# Patient Record
Sex: FEMALE | Race: WHITE | NOT HISPANIC OR LATINO | Employment: FULL TIME | URBAN - METROPOLITAN AREA
[De-identification: names, ages, dates, MRNs, and addresses within clinical notes are randomized per-mention and may not be internally consistent; named-entity substitution may affect disease eponyms.]

---
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
## 2022-01-13 ENCOUNTER — VBI (OUTPATIENT)
Dept: ADMINISTRATIVE | Facility: OTHER | Age: 22
End: 2022-01-13

## 2022-01-13 NOTE — TELEPHONE ENCOUNTER
Upon review of the In Basket request we were able to locate, review, and update the patient chart as requested for Chlamydia  Any additional questions or concerns should be emailed to the Practice Liaisons via [email or web address] email, please do not reply via In Basket      Thank you  Trinity Sanches

## 2022-06-02 ENCOUNTER — ANNUAL EXAM (OUTPATIENT)
Dept: OBGYN CLINIC | Facility: CLINIC | Age: 22
End: 2022-06-02
Payer: COMMERCIAL

## 2022-06-02 VITALS
SYSTOLIC BLOOD PRESSURE: 110 MMHG | WEIGHT: 197 LBS | DIASTOLIC BLOOD PRESSURE: 70 MMHG | HEIGHT: 62 IN | BODY MASS INDEX: 36.25 KG/M2

## 2022-06-02 DIAGNOSIS — Z01.419 ENCOUNTER FOR GYNECOLOGICAL EXAMINATION WITHOUT ABNORMAL FINDING: Primary | ICD-10-CM

## 2022-06-02 DIAGNOSIS — Z11.3 SCREEN FOR STD (SEXUALLY TRANSMITTED DISEASE): ICD-10-CM

## 2022-06-02 DIAGNOSIS — Z30.011 ENCOUNTER FOR PRESCRIPTION OF ORAL CONTRACEPTIVES: ICD-10-CM

## 2022-06-02 PROCEDURE — S0612 ANNUAL GYNECOLOGICAL EXAMINA: HCPCS | Performed by: NURSE PRACTITIONER

## 2022-06-02 RX ORDER — FLUVOXAMINE MALEATE 25 MG
TABLET ORAL
COMMUNITY
Start: 2022-04-27

## 2022-06-02 RX ORDER — NORETHINDRONE ACETATE AND ETHINYL ESTRADIOL 1MG-20(21)
1 KIT ORAL DAILY
Qty: 84 TABLET | Refills: 3 | Status: SHIPPED | OUTPATIENT
Start: 2022-06-02

## 2022-06-02 RX ORDER — FLUVOXAMINE MALEATE 100 MG
100 TABLET ORAL DAILY
COMMUNITY
Start: 2022-04-27

## 2022-06-02 NOTE — PATIENT INSTRUCTIONS
Pap smear to start at age 24 as per ASCCP guidelines  GC/CT cultures annually once sexually active, always condom use when sexually active, birth control as directed  Use seat belt in every car ride, avoid smoking and alcohol use, exercise most days of the week, obtain appropriate nutrition and hydration, follow up with PCP for appropriate vaccine schedule  Age 24-51 calcium 1000mg daily intake  Vit D daily recommended  Monthly breast self exam to start at age 25

## 2022-06-02 NOTE — PROGRESS NOTES
Assessment/Plan:  Pap smear to start at age 24 as per ASCCP guidelines  GC/CT cultures annually once sexually active, always condom use when sexually active, birth control as directed  Use seat belt in every car ride, avoid smoking and alcohol use, exercise most days of the week, obtain appropriate nutrition and hydration, follow up with PCP for appropriate vaccine schedule  Age 24-51 calcium 1000mg daily intake  Vit D daily recommended  Monthly breast self exam to start at age 25  Diagnoses and all orders for this visit:    Encounter for gynecological examination without abnormal finding  -     Chlamydia/GC amplified DNA by PCR    Encounter for prescription of oral contraceptives  -     norethindrone-ethinyl estradiol (Loestrin Fe 1/20) 1-20 MG-MCG per tablet; Take 1 tablet by mouth daily May take continuous active pills skipping placebos    Screen for STD (sexually transmitted disease)  -     Chlamydia/GC amplified DNA by PCR    Other orders  -     fluvoxaMINE (LUVOX) 100 mg tablet; Take 100 mg by mouth daily  -     fluvoxaMINE (LUVOX) 25 MG tablet; TAKE 1 TABLET ORALLY DAILY IN THE EVENING          Subjective:      Patient ID: Lane Evans is a 25 y o  female  Here for annual gyn ON OCP Has been off 2 mos as delayed getting here  Has gotten 2 periods and feels better getting a period Prev was on continuous OCP s sexually active so would like to get a period  Denies abdominal or pelvic pain  Bowel and bladder are normal No unusual discharge       The following portions of the patient's history were reviewed and updated as appropriate: allergies, current medications, past family history, past medical history, past social history, past surgical history and problem list     Review of Systems   Constitutional: Negative for fatigue and unexpected weight change  Gastrointestinal: Negative for abdominal distention, abdominal pain, constipation and diarrhea     Genitourinary: Negative for difficulty urinating, dyspareunia, dysuria, frequency, genital sores, menstrual problem, pelvic pain, urgency, vaginal bleeding, vaginal discharge and vaginal pain  Neurological: Negative for headaches  Hematological: Negative for adenopathy  Psychiatric/Behavioral: Negative  Negative for dysphoric mood  The patient is not nervous/anxious  Objective:      /70 (BP Location: Left arm, Patient Position: Sitting, Cuff Size: Standard)   Ht 5' 2" (1 575 m)   Wt 89 4 kg (197 lb)   LMP 05/10/2022   BMI 36 03 kg/m²          Physical Exam  Vitals and nursing note reviewed  Constitutional:       General: She is not in acute distress  Appearance: Normal appearance  HENT:      Head: Normocephalic and atraumatic  Pulmonary:      Effort: Pulmonary effort is normal    Chest:   Breasts: Breasts are symmetrical       Right: Normal  No mass, nipple discharge, skin change, tenderness or axillary adenopathy  Left: Normal  No mass, nipple discharge, skin change, tenderness or axillary adenopathy  Abdominal:      General: There is no distension  Palpations: Abdomen is soft  Tenderness: There is no abdominal tenderness  There is no guarding or rebound  Genitourinary:     General: Normal vulva  Exam position: Lithotomy position  Labia:         Right: No rash, tenderness, lesion or injury  Left: No rash, tenderness, lesion or injury  Urethra: No prolapse, urethral pain, urethral swelling or urethral lesion  Vagina: Normal  No erythema or lesions  Cervix: No cervical motion tenderness, discharge, lesion or cervical bleeding  Uterus: Normal        Adnexa: Right adnexa normal and left adnexa normal         Right: No mass or tenderness  Left: No mass or tenderness  Rectum: No mass or external hemorrhoid  Comments: G/C from cervix  Musculoskeletal:         General: Normal range of motion     Lymphadenopathy:      Upper Body:      Right upper body: No axillary adenopathy  Left upper body: No axillary adenopathy  Lower Body: No right inguinal adenopathy  No left inguinal adenopathy  Skin:     General: Skin is warm and dry  Neurological:      Mental Status: She is alert and oriented to person, place, and time  Psychiatric:         Mood and Affect: Mood normal          Behavior: Behavior normal          Thought Content:  Thought content normal          Judgment: Judgment normal

## 2022-06-03 LAB
C TRACH RRNA SPEC QL NAA+PROBE: NEGATIVE
N GONORRHOEA RRNA SPEC QL NAA+PROBE: NEGATIVE

## 2023-05-16 DIAGNOSIS — Z30.011 ENCOUNTER FOR PRESCRIPTION OF ORAL CONTRACEPTIVES: ICD-10-CM

## 2023-05-16 RX ORDER — NORETHINDRONE ACETATE AND ETHINYL ESTRADIOL AND FERROUS FUMARATE 1MG-20(21)
KIT ORAL
Qty: 84 TABLET | Refills: 0 | Status: SHIPPED | OUTPATIENT
Start: 2023-05-16

## 2023-06-27 ENCOUNTER — PATIENT MESSAGE (OUTPATIENT)
Dept: OBGYN CLINIC | Facility: CLINIC | Age: 23
End: 2023-06-27

## 2024-01-30 ENCOUNTER — ANNUAL EXAM (OUTPATIENT)
Dept: OBGYN CLINIC | Facility: CLINIC | Age: 24
End: 2024-01-30
Payer: COMMERCIAL

## 2024-01-30 VITALS
BODY MASS INDEX: 36.54 KG/M2 | WEIGHT: 206.2 LBS | SYSTOLIC BLOOD PRESSURE: 118 MMHG | HEIGHT: 63 IN | DIASTOLIC BLOOD PRESSURE: 72 MMHG

## 2024-01-30 DIAGNOSIS — Z11.3 SCREEN FOR STD (SEXUALLY TRANSMITTED DISEASE): ICD-10-CM

## 2024-01-30 DIAGNOSIS — B96.89 BV (BACTERIAL VAGINOSIS): ICD-10-CM

## 2024-01-30 DIAGNOSIS — B37.31 VAGINAL YEAST INFECTION: ICD-10-CM

## 2024-01-30 DIAGNOSIS — N89.8 VAGINAL IRRITATION: ICD-10-CM

## 2024-01-30 DIAGNOSIS — N76.0 BV (BACTERIAL VAGINOSIS): ICD-10-CM

## 2024-01-30 DIAGNOSIS — Z01.419 ENCOUNTER FOR GYNECOLOGICAL EXAMINATION WITHOUT ABNORMAL FINDING: Primary | ICD-10-CM

## 2024-01-30 LAB
BV WHIFF TEST VAG QL: ABNORMAL
CLUE CELLS SPEC QL WET PREP: ABNORMAL
PH SMN: 5 [PH]
SL AMB POCT WET MOUNT: ABNORMAL
T VAGINALIS VAG QL WET PREP: ABNORMAL
YEAST VAG QL WET PREP: ABNORMAL

## 2024-01-30 PROCEDURE — 87210 SMEAR WET MOUNT SALINE/INK: CPT | Performed by: NURSE PRACTITIONER

## 2024-01-30 PROCEDURE — S0612 ANNUAL GYNECOLOGICAL EXAMINA: HCPCS | Performed by: NURSE PRACTITIONER

## 2024-01-30 RX ORDER — METRONIDAZOLE 500 MG/1
500 TABLET ORAL EVERY 12 HOURS SCHEDULED
Qty: 14 TABLET | Refills: 0 | Status: SHIPPED | OUTPATIENT
Start: 2024-01-30 | End: 2024-02-06

## 2024-01-30 RX ORDER — FLUCONAZOLE 150 MG/1
150 TABLET ORAL ONCE
Qty: 2 TABLET | Refills: 0 | Status: SHIPPED | OUTPATIENT
Start: 2024-01-30 | End: 2024-01-30

## 2024-01-30 NOTE — PATIENT INSTRUCTIONS
Pap every 3 years if normal, STI testing as indicated, exercise most days of week, obtain appropriate diet and hydration, Calcium 1000mg + 600 vit D daily, .   Annual mammogram starting at age 40, monthly breast self exam.   BV and yeast in wet mount Open to air at bedtime. Cotton underwear. No thongs. Wear looser fitting clothing. Get out of exercise clothes and bathing suits ASAP. Avoid bathroom wipes, feminine washes/scented soaps. Wash with cetaphil cleanser. Watch sugar and carb intake. Refrain from sexual activity while on treatment.

## 2024-01-30 NOTE — PROGRESS NOTES
Assessment/Plan:  Pap every 3 years if normal, STI testing as indicated, exercise most days of week, obtain appropriate diet and hydration, Calcium 1000mg + 600 vit D daily, .   Annual mammogram starting at age 40, monthly breast self exam.   BV and yeast in wet mount Open to air at bedtime. Cotton underwear. No thongs. Wear looser fitting clothing. Get out of exercise clothes and bathing suits ASAP. Avoid bathroom wipes, feminine washes/scented soaps. Wash with cetaphil cleanser. Watch sugar and carb intake. Refrain from sexual activity while on treatment.        Diagnoses and all orders for this visit:    Encounter for gynecological examination without abnormal finding  -     IGP, CtNg, rfx Aptima HPV ASCU    Screen for STD (sexually transmitted disease)  -     IGP, CtNg, rfx Aptima HPV ASCU    Encounter for prescription of oral contraceptives    BV (bacterial vaginosis)  -     metroNIDAZOLE (FLAGYL) 500 mg tablet; Take 1 tablet (500 mg total) by mouth every 12 (twelve) hours for 7 days    Vaginal yeast infection  -     fluconazole (DIFLUCAN) 150 mg tablet; Take 1 tablet (150 mg total) by mouth once for 1 dose Repeat in 3 days    Vaginal irritation  -     POCT wet mount          Subjective:      Patient ID: Diana Mendez is a 23 y.o. female.    Here for annual gyn Originally scheduled for 1/25 with vaginal dryness itching discharge x 2-3 days  cx and rescheduled for today Denies discharge oor odor but cont with irritation/dryness feels off New Partner would like testing Last seen 6/2022 for [place] Prev on OCP D/C'd  last year Didn't need and mentally has felt better without it + condoms Periods generally monthly However skipped in Dec was not SA no chance of preg ? Stress Did get Padilla 10 and that was heavy.Went to urgent care last month Burning with urination initially said UA ok She took AZO [employer] fluids improved called her and said she was + UTI AS better did not take abx Due for first pap working [employer]  "[facility]         The following portions of the patient's history were reviewed and updated as appropriate: allergies, current medications, past family history, past medical history, past social history, past surgical history, and problem list.    Review of Systems   Constitutional:  Negative for fatigue and unexpected weight change.   Gastrointestinal:  Negative for abdominal distention, abdominal pain, constipation and diarrhea.   Genitourinary:  Negative for difficulty urinating, dyspareunia, dysuria, frequency, genital sores, menstrual problem, pelvic pain, urgency, vaginal bleeding, vaginal discharge and vaginal pain.        Vaginal irritation   Neurological:  Negative for headaches.   Psychiatric/Behavioral: Negative.  Negative for dysphoric mood. The patient is not nervous/anxious.          Objective:      /72 (BP Location: Right arm, Patient Position: Sitting, Cuff Size: Standard)   Ht 5' 3\" (1.6 m)   Wt 93.5 kg (206 lb 3.2 oz)   LMP 01/10/2024 (Approximate)   Breastfeeding No   BMI 36.53 kg/m²          Physical Exam  Vitals and nursing note reviewed.   Constitutional:       General: She is not in acute distress.     Appearance: Normal appearance.   HENT:      Head: Normocephalic and atraumatic.   Pulmonary:      Effort: Pulmonary effort is normal.   Chest:   Breasts:     Breasts are symmetrical.      Right: Normal. No mass, nipple discharge, skin change or tenderness.      Left: Normal. No mass, nipple discharge, skin change or tenderness.   Abdominal:      General: There is no distension.      Palpations: Abdomen is soft.      Tenderness: There is no abdominal tenderness. There is no guarding or rebound.   Genitourinary:     General: Normal vulva.      Exam position: Lithotomy position.      Labia:         Right: No rash, tenderness, lesion or injury.         Left: No rash, tenderness, lesion or injury.       Urethra: No prolapse, urethral pain, urethral swelling or " urethral lesion.      Vagina: Normal. No erythema or lesions.      Cervix: No cervical motion tenderness, discharge, lesion or cervical bleeding.      Uterus: Normal.       Adnexa: Right adnexa normal and left adnexa normal.        Right: No mass or tenderness.          Left: No mass or tenderness.        Rectum: No mass or external hemorrhoid.   Musculoskeletal:         General: Normal range of motion.   Lymphadenopathy:      Upper Body:      Right upper body: No axillary adenopathy.      Left upper body: No axillary adenopathy.      Lower Body: No right inguinal adenopathy. No left inguinal adenopathy.   Skin:     General: Skin is warm and dry.   Neurological:      Mental Status: She is alert and oriented to person, place, and time.   Psychiatric:         Mood and Affect: Mood normal.         Behavior: Behavior normal.         Thought Content: Thought content normal.         Judgment: Judgment normal.

## 2024-02-02 LAB
C TRACH RRNA CVX QL NAA+PROBE: NEGATIVE
CYTOLOGIST CVX/VAG CYTO: NORMAL
DX ICD CODE: NORMAL
N GONORRHOEA RRNA CVX QL NAA+PROBE: NEGATIVE
OTHER STN SPEC: NORMAL
OTHER STN SPEC: NORMAL
PATH REPORT.FINAL DX SPEC: NORMAL
SL AMB NOTE:: NORMAL
SL AMB SPECIMEN ADEQUACY: NORMAL
SL AMB TEST METHODOLOGY: NORMAL

## 2025-01-31 ENCOUNTER — OFFICE VISIT (OUTPATIENT)
Dept: OBGYN CLINIC | Facility: CLINIC | Age: 25
End: 2025-01-31
Payer: COMMERCIAL

## 2025-01-31 VITALS
DIASTOLIC BLOOD PRESSURE: 62 MMHG | HEIGHT: 62 IN | SYSTOLIC BLOOD PRESSURE: 122 MMHG | BODY MASS INDEX: 36.44 KG/M2 | WEIGHT: 198 LBS

## 2025-01-31 DIAGNOSIS — Z11.3 SCREENING EXAMINATION FOR VENEREAL DISEASE: ICD-10-CM

## 2025-01-31 DIAGNOSIS — R30.0 DYSURIA: ICD-10-CM

## 2025-01-31 DIAGNOSIS — N89.8 VAGINAL DISCHARGE: ICD-10-CM

## 2025-01-31 DIAGNOSIS — B37.9 YEAST INFECTION: Primary | ICD-10-CM

## 2025-01-31 LAB
CLUE CELLS SPEC QL WET PREP: ABNORMAL
PH SMN: 4 [PH]
SL AMB  POCT GLUCOSE, UA: ABNORMAL
SL AMB LEUKOCYTE ESTERASE,UA: ABNORMAL
SL AMB POCT BILIRUBIN,UA: ABNORMAL
SL AMB POCT BLOOD,UA: ABNORMAL
SL AMB POCT CLARITY,UA: ABNORMAL
SL AMB POCT COLOR,UA: ABNORMAL
SL AMB POCT KETONES,UA: ABNORMAL
SL AMB POCT NITRITE,UA: ABNORMAL
SL AMB POCT PH,UA: 5
SL AMB POCT SPECIFIC GRAVITY,UA: 1.03
SL AMB POCT URINE PROTEIN: ABNORMAL
SL AMB POCT UROBILINOGEN: 0.2
SL AMB POCT WET MOUNT: ABNORMAL
T VAGINALIS VAG QL WET PREP: ABNORMAL
YEAST VAG QL WET PREP: ABNORMAL

## 2025-01-31 PROCEDURE — 87210 SMEAR WET MOUNT SALINE/INK: CPT | Performed by: PHYSICIAN ASSISTANT

## 2025-01-31 PROCEDURE — 81002 URINALYSIS NONAUTO W/O SCOPE: CPT | Performed by: PHYSICIAN ASSISTANT

## 2025-01-31 PROCEDURE — 99213 OFFICE O/P EST LOW 20 MIN: CPT | Performed by: PHYSICIAN ASSISTANT

## 2025-01-31 RX ORDER — RIBOFLAVIN (VITAMIN B2) 100 MG
100 TABLET ORAL DAILY
COMMUNITY

## 2025-01-31 RX ORDER — LORATADINE 10 MG/1
10 TABLET ORAL DAILY
COMMUNITY

## 2025-01-31 RX ORDER — FLUCONAZOLE 150 MG/1
150 TABLET ORAL EVERY OTHER DAY
Qty: 2 TABLET | Refills: 0 | Status: SHIPPED | OUTPATIENT
Start: 2025-01-31 | End: 2025-02-03

## 2025-01-31 RX ORDER — OMEGA-3S/DHA/EPA/FISH OIL/D3 300MG-1000
400 CAPSULE ORAL DAILY
COMMUNITY

## 2025-01-31 NOTE — PROGRESS NOTES
[facility] [address], [address], [place] [address]    ASSESSMENT/PLAN:     1. Yeast infection  Assessment & Plan:  Reviewed yeast infection on exam.   Reviewed small area of irritation on cervix may be secondary to irritation from Monistat. Recommend discontinuing monistat at this time. No other sores or lesions.   Script for Diflucan 150mg every other day for 2 doses given.   For prevention: Recommend acidophilus or yogurt daily, avoid irritating soaps or lotions, no tight fitted pants or underwear, avoid bubble baths, do not stay in wet swimsuit.  STD cultures done. Urine sent for culture. Offered antibiotics for possible UTI, per patient does not feel like UTI, feels like burning likely from yeast infection. Aware to go to urgent care if develops UTI symptoms prior to urine culture returning.   Return to office if symptoms are not improving or returning.   Orders:  -     fluconazole (DIFLUCAN) 150 mg tablet; Take 1 tablet (150 mg total) by mouth every other day for 2 doses  2. Dysuria  -     Urine culture  -     Urinalysis with microscopic  -     POCT urine dip  3. Vaginal discharge  -     NuSwab Vaginitis Plus (VG+)  -     POCT wet mount  4. Screening examination for venereal disease  -     NuSwab Vaginitis Plus (VG+)      CC:  Monday or Tuesday noticed light itching, yesterday it got really bad, swollen with bright red blood on tissue when wiping, also very dry, used monistat last night around 9pm, having thicker discharge today not sure if it's cream or if it's actual discharge, no change in detergents or washes, did have sex with a new partner on Saturday, no contraception used     HPI: Diana Mendez is a 24 y.o.  who presents for vaginal itching and irritation. Reports new partner on Saturday, did not use condoms. Reports Monday, Tuesday started with itching then some swelling of vulva and bright red blood on tissue with wiping. Used Monistat 3 day last night. Reports had  terrible burning for about an hour after using. More discharge today, unsure if monistat.   Denies fever, chills, pelvic pain. Denies any pelvic sores. Reports some burning with urination but does not feel like typical UTI. Denies urgency, hesitancy, hematuria, flank pain.   Denies bowel issues.     ROS: Negative except as noted in HPI    Patient's last menstrual period was 01/14/2025 (approximate).       She  reports being sexually active and has had partner(s) who are male. She reports using the following methods of birth control/protection: Condom Male and Condom.       The following portions of the patient's history were reviewed and updated as appropriate:   Past Medical History:   Diagnosis Date    Anxiety      No past surgical history on file.  Family History   Problem Relation Age of Onset    Diabetes Paternal Grandfather      Social History     Socioeconomic History    Marital status: Single     Spouse name: Not on file    Number of children: Not on file    Years of education: Not on file    Highest education level: Not on file   Occupational History    Not on file   Tobacco Use    Smoking status: Never    Smokeless tobacco: Never   Vaping Use    Vaping status: Never Used   Substance and Sexual Activity    Alcohol use: Never    Drug use: Never    Sexual activity: Yes     Partners: Male     Birth control/protection: Condom Male, Condom   Other Topics Concern    Not on file   Social History Narrative    Exercise: Occassionally    Domestic violence: No    History of drug/alcohol abuse denies         Social Drivers of Health     Financial Resource Strain: Not on file   Food Insecurity: Not on file   Transportation Needs: Not on file   Physical Activity: Not on file   Stress: Not on file   Social Connections: Not on file   Intimate Partner Violence: Not on file   Housing Stability: Not on file     Outpatient Medications Marked as Taking for the 1/31/25 encounter (Office Visit) with Carolyne Nair PA-C  "  Medication    Ascorbic Acid (vitamin C) 100 MG tablet    cholecalciferol (VITAMIN D3) 400 units tablet    fluconazole (DIFLUCAN) 150 mg tablet    fluvoxaMINE (LUVOX) 100 mg tablet    loratadine (CLARITIN) 10 mg tablet     No Known Allergies        Objective:  /62 (BP Location: Right arm, Patient Position: Sitting, Cuff Size: Standard)   Ht 5' 2\" (1.575 m)   Wt 89.8 kg (198 lb)   LMP 01/14/2025 (Approximate)   BMI 36.21 kg/m²        Chaperone present? Offered, declines. .    Physical Exam  Constitutional:       Appearance: Normal appearance. She is well-developed.   Genitourinary:      Vulva and bladder normal.      No lesions in the vagina.      Right Labia: No rash, tenderness, lesions or skin changes.     Left Labia: No tenderness, lesions, skin changes or rash.     No labial fusion noted.      No inguinal adenopathy present in the right or left side.     Vaginal discharge (thick white discharge) present.      No vaginal erythema, tenderness or bleeding.        Right Adnexa: not tender, not full and no mass present.     Left Adnexa: not tender, not full and no mass present.     No cervical motion tenderness, discharge or lesion.            Uterus is not enlarged, tender or irregular.      No uterine mass detected.     No urethral prolapse, tenderness or mass present.      Bladder is not tender.    HENT:      Head: Normocephalic and atraumatic.   Neck:      Thyroid: No thyromegaly.   Cardiovascular:      Rate and Rhythm: Normal rate and regular rhythm.      Heart sounds: Normal heart sounds. No murmur heard.     No friction rub. No gallop.   Pulmonary:      Effort: Pulmonary effort is normal. No respiratory distress.      Breath sounds: Normal breath sounds. No wheezing.   Abdominal:      General: There is no distension.      Palpations: Abdomen is soft. There is no mass.      Tenderness: There is no abdominal tenderness. There is no guarding or rebound.      Hernia: No hernia is present. "   Lymphadenopathy:      Cervical: No cervical adenopathy.      Upper Body:      Right upper body: No pectoral adenopathy.      Left upper body: No pectoral adenopathy.      Lower Body: No right inguinal adenopathy. No left inguinal adenopathy.   Neurological:      Mental Status: She is alert and oriented to person, place, and time.   Skin:     General: Skin is warm and dry.   Psychiatric:         Behavior: Behavior normal.     Wet mount: budding yeast. No trichomonads or clue cells. Ph: 4.         Carolyne Nair PA-C  2/1/2025 11:17 AM

## 2025-02-01 PROBLEM — B37.9 YEAST INFECTION: Status: ACTIVE | Noted: 2025-02-01

## 2025-02-01 NOTE — ASSESSMENT & PLAN NOTE
Reviewed yeast infection on exam.   Reviewed small area of irritation on cervix may be secondary to irritation from Monistat. Recommend discontinuing monistat at this time. No other sores or lesions.   Script for Diflucan 150mg every other day for 2 doses given.   For prevention: Recommend acidophilus or yogurt daily, avoid irritating soaps or lotions, no tight fitted pants or underwear, avoid bubble baths, do not stay in wet swimsuit.  STD cultures done. Urine sent for culture. Offered antibiotics for possible UTI, per patient does not feel like UTI, feels like burning likely from yeast infection. Aware to go to urgent care if develops UTI symptoms prior to urine culture returning.   Return to office if symptoms are not improving or returning.

## 2025-02-03 LAB
A VAGINAE DNA VAG QL NAA+PROBE: ABNORMAL SCORE
APPEARANCE UR: CLEAR
BACTERIA UR CULT: ABNORMAL
BACTERIA URNS QL MICRO: NORMAL
BILIRUB UR QL STRIP: NEGATIVE
BVAB2 DNA VAG QL NAA+PROBE: ABNORMAL SCORE
C ALBICANS DNA VAG QL NAA+PROBE: POSITIVE
C GLABRATA DNA VAG QL NAA+PROBE: NEGATIVE
C TRACH RRNA SPEC QL NAA+PROBE: NEGATIVE
CASTS URNS QL MICRO: NORMAL /LPF
COLOR UR: YELLOW
EPI CELLS #/AREA URNS HPF: NORMAL /HPF (ref 0–10)
GLUCOSE UR QL: NEGATIVE
HGB UR QL STRIP: NEGATIVE
KETONES UR QL STRIP: NEGATIVE
LEUKOCYTE ESTERASE UR QL STRIP: NEGATIVE
Lab: ABNORMAL
Lab: ABNORMAL
MEGA1 DNA VAG QL NAA+PROBE: ABNORMAL SCORE
MICRO URNS: NORMAL
MICRO URNS: NORMAL
N GONORRHOEA RRNA SPEC QL NAA+PROBE: NEGATIVE
NITRITE UR QL STRIP: NEGATIVE
PH UR STRIP: 5.5 [PH] (ref 5–7.5)
PROT UR QL STRIP: NEGATIVE
RBC #/AREA URNS HPF: NORMAL /HPF (ref 0–2)
SP GR UR: 1.03 (ref 1–1.03)
T VAGINALIS RRNA SPEC QL NAA+PROBE: NEGATIVE
UROBILINOGEN UR STRIP-ACNC: 1 MG/DL (ref 0.2–1)
WBC #/AREA URNS HPF: NORMAL /HPF (ref 0–5)

## 2025-02-04 ENCOUNTER — RESULTS FOLLOW-UP (OUTPATIENT)
Dept: OBGYN CLINIC | Facility: CLINIC | Age: 25
End: 2025-02-04

## 2025-02-13 ENCOUNTER — ANNUAL EXAM (OUTPATIENT)
Dept: OBGYN CLINIC | Facility: CLINIC | Age: 25
End: 2025-02-13
Payer: COMMERCIAL

## 2025-02-13 VITALS
SYSTOLIC BLOOD PRESSURE: 124 MMHG | WEIGHT: 198 LBS | BODY MASS INDEX: 36.44 KG/M2 | DIASTOLIC BLOOD PRESSURE: 70 MMHG | HEIGHT: 62 IN

## 2025-02-13 DIAGNOSIS — Z01.419 GYNECOLOGIC EXAM NORMAL: Primary | ICD-10-CM

## 2025-02-13 PROCEDURE — S0612 ANNUAL GYNECOLOGICAL EXAMINA: HCPCS | Performed by: PHYSICIAN ASSISTANT

## 2025-02-13 NOTE — PROGRESS NOTES
[facility] - [address], [address], [place] [address]    ASSESSMENT/PLAN: Diana Mendez is a 24 y.o.  who presents for annual gynecologic exam.    Encounter for routine gynecologic examination  - Routine well woman exam completed today.  - Cervical Cancer Screening: Current ASCCP Guidelines reviewed. Last Pap: 2024 NILM, STD testing negative. History of abnormal: no  - HPV Vaccination status: unsure   - STI screening offered including HIV testing: offered, pt declined  - Contraceptive counseling discussed.  Current contraception: condoms, long distance relationship:       Additional problems addressed during this visit:  1. Gynecologic exam normal  Assessment & Plan:  Pap guidelines reviewed. Pap deferred secondary to negative pap in  in this low risk patient.   Reviewed  birth control options including a different OCP, NuvaRing, Nexplanon, Depo Provera, Josy/Kyleena/Mirena IUD, ParaGard IUD.   Reviewed risks of IUD insertion including perforation, migration that can lead to scarring, surgery and issues with infertility. Reviewed expulsion risk, risk of ectopic pregnancy as well as pelvic inflammatory disease. Reviewed common bleeding patterns and side effects.  Reviewed Nexplanon insertion, removal, common bleeding patterns and side effects. Reviewed risks of damage to nerves, blood vessels during insertion or removal.   Will consider options and call if she desires any of them.   Return to office for annual or as needed.         CC:  Annual Gynecologic Examination    HPI: Diana Mendez is a 24 y.o.  who presents for annual gynecologic examination. Junel Fe 1/20 in the past, reports weight gain, breast growth, mood swings. Would skip menses at times and then would have heavy, painful menses.   Would like to discuss options, although at this time is in a long distance relationship and will likely hold off on options.   ROS: Negative except as noted in HPI    Patient's  "last menstrual period was 02/11/2025 (exact date).       She  reports being sexually active and has had partner(s) who are male. She reports using the following methods of birth control/protection: Condom Male and Condom.       The following portions of the patient's history were reviewed and updated as appropriate:   Past Medical History:   Diagnosis Date    Anxiety      History reviewed. No pertinent surgical history.  Family History   Problem Relation Age of Onset    Diabetes Paternal Grandfather      Social History     Socioeconomic History    Marital status: Single     Spouse name: None    Number of children: None    Years of education: None    Highest education level: None   Occupational History    None   Tobacco Use    Smoking status: Never    Smokeless tobacco: Never   Vaping Use    Vaping status: Never Used   Substance and Sexual Activity    Alcohol use: Never    Drug use: Never    Sexual activity: Yes     Partners: Male     Birth control/protection: Condom Male, Condom   Other Topics Concern    None   Social History Narrative    Exercise: Occassionally    Domestic violence: No    History of drug/alcohol abuse denies         Social Drivers of Health     Financial Resource Strain: Not on file   Food Insecurity: Not on file   Transportation Needs: Not on file   Physical Activity: Not on file   Stress: Not on file   Social Connections: Not on file   Intimate Partner Violence: Not on file   Housing Stability: Not on file     Outpatient Medications Marked as Taking for the 2/13/25 encounter (Annual Exam) with Carolyne Nair PA-C   Medication    Ascorbic Acid (vitamin C) 100 MG tablet    cholecalciferol (VITAMIN D3) 400 units tablet    fluvoxaMINE (LUVOX) 100 mg tablet    loratadine (CLARITIN) 10 mg tablet     No Known Allergies        Objective:  /70 (BP Location: Left arm, Patient Position: Sitting, Cuff Size: Standard)   Ht 5' 2\" (1.575 m)   Wt 89.8 kg (198 lb)   LMP 02/11/2025 (Exact Date)   " BMI 36.21 kg/m²        Chaperone present? Offered, declines. .     Physical Exam  Constitutional:       Appearance: Normal appearance. She is well-developed.   Genitourinary:      Vulva and bladder normal.      No lesions in the vagina.      Right Labia: No rash, tenderness, lesions or skin changes.     Left Labia: No tenderness, lesions, skin changes or rash.     No labial fusion noted.      No inguinal adenopathy present in the right or left side.     No vaginal discharge, erythema, tenderness or bleeding.        Right Adnexa: not tender, not full and no mass present.     Left Adnexa: not tender, not full and no mass present.     No cervical motion tenderness, discharge or lesion.      Uterus is not enlarged, tender or irregular.      No uterine mass detected.     No urethral prolapse, tenderness or mass present.      Bladder is not tender.    Breasts:     Breasts are symmetrical.      Right: No swelling, bleeding, inverted nipple, mass, nipple discharge, skin change or tenderness.      Left: No swelling, bleeding, inverted nipple, mass, nipple discharge, skin change or tenderness.   HENT:      Head: Normocephalic and atraumatic.   Neck:      Thyroid: No thyromegaly.   Cardiovascular:      Rate and Rhythm: Normal rate and regular rhythm.      Heart sounds: Normal heart sounds. No murmur heard.     No friction rub. No gallop.   Pulmonary:      Effort: Pulmonary effort is normal. No respiratory distress.      Breath sounds: Normal breath sounds. No wheezing.   Abdominal:      General: There is no distension.      Palpations: Abdomen is soft. There is no mass.      Tenderness: There is no abdominal tenderness. There is no guarding or rebound.      Hernia: No hernia is present.   Lymphadenopathy:      Cervical: No cervical adenopathy.      Upper Body:      Right upper body: No supraclavicular, axillary or pectoral adenopathy.      Left upper body: No supraclavicular, axillary or pectoral adenopathy.      Lower Body:  No right inguinal adenopathy. No left inguinal adenopathy.   Neurological:      Mental Status: She is alert and oriented to person, place, and time.   Skin:     General: Skin is warm and dry.   Psychiatric:         Behavior: Behavior normal.             Carolyne Nair PA-C  2/17/2025 2:28 PM

## 2025-02-17 PROBLEM — Z01.419 GYNECOLOGIC EXAM NORMAL: Status: ACTIVE | Noted: 2025-02-17

## 2025-02-17 NOTE — ASSESSMENT & PLAN NOTE
Pap guidelines reviewed. Pap deferred secondary to negative pap in 2024 in this low risk patient.   Reviewed  birth control options including a different OCP, NuvaRing, Nexplanon, Depo Provera, Josy/Kyleena/Mirena IUD, ParaGard IUD.   Reviewed risks of IUD insertion including perforation, migration that can lead to scarring, surgery and issues with infertility. Reviewed expulsion risk, risk of ectopic pregnancy as well as pelvic inflammatory disease. Reviewed common bleeding patterns and side effects.  Reviewed Nexplanon insertion, removal, common bleeding patterns and side effects. Reviewed risks of damage to nerves, blood vessels during insertion or removal.   Will consider options and call if she desires any of them.   Return to office for annual or as needed.

## 2025-03-19 PROBLEM — Z01.419 GYNECOLOGIC EXAM NORMAL: Status: RESOLVED | Noted: 2025-02-17 | Resolved: 2025-03-19

## 2025-04-14 ENCOUNTER — PATIENT MESSAGE (OUTPATIENT)
Dept: OBGYN CLINIC | Facility: CLINIC | Age: 25
End: 2025-04-14

## 2025-04-14 DIAGNOSIS — G89.29 OTHER CHRONIC BACK PAIN: ICD-10-CM

## 2025-04-14 DIAGNOSIS — N62 LARGE BREASTS: Primary | ICD-10-CM

## 2025-04-14 DIAGNOSIS — M54.89 OTHER CHRONIC BACK PAIN: ICD-10-CM

## 2025-06-12 ENCOUNTER — TELEPHONE (OUTPATIENT)
Dept: PLASTIC SURGERY | Facility: CLINIC | Age: 25
End: 2025-06-12

## 2025-06-12 NOTE — TELEPHONE ENCOUNTER
Left voicemail for patient to return call to discuss criteria for breast reduction, needs 3 month of pt/chiro.